# Patient Record
Sex: MALE | Race: WHITE | Employment: FULL TIME | URBAN - METROPOLITAN AREA
[De-identification: names, ages, dates, MRNs, and addresses within clinical notes are randomized per-mention and may not be internally consistent; named-entity substitution may affect disease eponyms.]

---
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
## 2022-06-13 ENCOUNTER — HOSPITAL ENCOUNTER (EMERGENCY)
Age: 22
Discharge: HOME | End: 2022-06-13

## 2022-06-13 VITALS
BODY MASS INDEX: 21.31 KG/M2 | DIASTOLIC BLOOD PRESSURE: 67 MMHG | SYSTOLIC BLOOD PRESSURE: 114 MMHG | OXYGEN SATURATION: 99 % | TEMPERATURE: 97 F | WEIGHT: 175 LBS | HEIGHT: 76 IN | RESPIRATION RATE: 16 BRPM | HEART RATE: 70 BPM

## 2022-06-13 DIAGNOSIS — Z53.21 PATIENT LEFT BEFORE EVALUATION BY PHYSICIAN: Primary | ICD-10-CM

## 2022-06-13 ASSESSMENT — PAIN SCALES - GENERAL: PAINLEVEL_OUTOF10: 2

## 2022-06-13 ASSESSMENT — PAIN DESCRIPTION - LOCATION: LOCATION: ARM

## 2022-06-13 ASSESSMENT — PAIN - FUNCTIONAL ASSESSMENT: PAIN_FUNCTIONAL_ASSESSMENT: 0-10

## 2022-06-13 ASSESSMENT — PAIN DESCRIPTION - ORIENTATION: ORIENTATION: LEFT

## 2022-06-13 NOTE — ED PROVIDER NOTES
Patient left department prior to being evaluated by provider     DORA Fischer CNP  06/13/22 2000 [facility], APRN - CNP  06/13/22 1955

## 2022-07-28 ENCOUNTER — HOSPITAL ENCOUNTER (EMERGENCY)
Age: 22
Discharge: HOME | End: 2022-07-28
Attending: STUDENT IN AN ORGANIZED HEALTH CARE EDUCATION/TRAINING PROGRAM

## 2022-07-28 VITALS
HEART RATE: 98 BPM | OXYGEN SATURATION: 99 % | SYSTOLIC BLOOD PRESSURE: 117 MMHG | RESPIRATION RATE: 16 BRPM | DIASTOLIC BLOOD PRESSURE: 71 MMHG | TEMPERATURE: 98.1 F

## 2022-07-28 DIAGNOSIS — K04.7 DENTAL INFECTION: Primary | ICD-10-CM

## 2022-07-28 DIAGNOSIS — J02.9 SORE THROAT: ICD-10-CM

## 2022-07-28 LAB
RAPID INFLUENZA  B AGN: NEGATIVE
RAPID INFLUENZA A AGN: NEGATIVE
S PYO AG THROAT QL: NEGATIVE
SARS-COV-2, NAAT: NOT DETECTED

## 2022-07-28 PROCEDURE — 87081 CULTURE SCREEN ONLY: CPT

## 2022-07-28 PROCEDURE — 99283 EMERGENCY DEPT VISIT LOW MDM: CPT

## 2022-07-28 PROCEDURE — 6370000000 HC RX 637 (ALT 250 FOR IP): Performed by: REGISTERED NURSE

## 2022-07-28 PROCEDURE — 87635 SARS-COV-2 COVID-19 AMP PRB: CPT

## 2022-07-28 PROCEDURE — 87880 STREP A ASSAY W/OPTIC: CPT

## 2022-07-28 PROCEDURE — 87804 INFLUENZA ASSAY W/OPTIC: CPT

## 2022-07-28 PROCEDURE — 87077 CULTURE AEROBIC IDENTIFY: CPT

## 2022-07-28 RX ORDER — IBUPROFEN 600 MG/1
600 TABLET ORAL ONCE
Status: COMPLETED | OUTPATIENT
Start: 2022-07-28 | End: 2022-07-28

## 2022-07-28 RX ORDER — PENICILLIN V POTASSIUM 500 MG/1
500 TABLET ORAL 4 TIMES DAILY
Qty: 28 TABLET | Refills: 0 | Status: SHIPPED | OUTPATIENT
Start: 2022-07-28 | End: 2022-08-04

## 2022-07-28 RX ORDER — CHLORHEXIDINE GLUCONATE 0.12 MG/ML
15 RINSE ORAL 2 TIMES DAILY
Qty: 1 EACH | Refills: 0 | Status: SHIPPED | OUTPATIENT
Start: 2022-07-28 | End: 2022-08-11

## 2022-07-28 RX ORDER — NAPROXEN 500 MG/1
500 TABLET ORAL 2 TIMES DAILY PRN
Qty: 20 TABLET | Refills: 0 | Status: CANCELLED | OUTPATIENT
Start: 2022-07-28 | End: 2022-08-07

## 2022-07-28 RX ORDER — PENICILLIN V POTASSIUM 500 MG/1
500 TABLET ORAL 4 TIMES DAILY
Qty: 28 TABLET | Refills: 0 | Status: CANCELLED | OUTPATIENT
Start: 2022-07-28 | End: 2022-08-04

## 2022-07-28 RX ORDER — NAPROXEN 500 MG/1
500 TABLET ORAL 2 TIMES DAILY PRN
Qty: 20 TABLET | Refills: 0 | Status: SHIPPED | OUTPATIENT
Start: 2022-07-28 | End: 2022-08-07

## 2022-07-28 RX ORDER — CHLORHEXIDINE GLUCONATE 0.12 MG/ML
15 RINSE ORAL 2 TIMES DAILY
Qty: 1 EACH | Refills: 0 | Status: CANCELLED | OUTPATIENT
Start: 2022-07-28 | End: 2022-08-11

## 2022-07-28 RX ADMIN — IBUPROFEN 600 MG: 600 TABLET ORAL at 11:37

## 2022-07-28 ASSESSMENT — ENCOUNTER SYMPTOMS
RHINORRHEA: 0
ABDOMINAL PAIN: 0
FACIAL SWELLING: 0
SORE THROAT: 1
SINUS PAIN: 0
SHORTNESS OF BREATH: 0
BACK PAIN: 0
CONSTIPATION: 0
DIARRHEA: 0
COUGH: 0
SINUS PRESSURE: 0
CHEST TIGHTNESS: 0
NAUSEA: 0
VOMITING: 0
EYE PAIN: 0
WHEEZING: 0
TROUBLE SWALLOWING: 0

## 2022-07-28 ASSESSMENT — PAIN DESCRIPTION - LOCATION: LOCATION: THROAT

## 2022-07-28 ASSESSMENT — PAIN - FUNCTIONAL ASSESSMENT: PAIN_FUNCTIONAL_ASSESSMENT: 0-10

## 2022-07-28 ASSESSMENT — PAIN SCALES - GENERAL: PAINLEVEL_OUTOF10: 7

## 2022-07-28 ASSESSMENT — PAIN DESCRIPTION - ORIENTATION: ORIENTATION: RIGHT

## 2022-07-28 NOTE — Clinical Note
Mother accompanied Rajendra Gomez to the emergency department on 7/28/2022. They may return to work on 07/29/2022. If you have any questions or concerns, please don't hesitate to call.       Montez Johnson, DORA - CNP

## 2022-07-28 NOTE — ED PROVIDER NOTES
[facility]  ED  EMERGENCY DEPARTMENT ENCOUNTER        Pt Name: Val Field  MRN: [id number]  [place] 2000  Date of evaluation: 7/28/2022  Provider: DORA Bui CNP  PCP: No primary care provider on file. This patient was seen and evaluated by the attending physician  Todd Villaseñor MD.    I have evaluated this patient. My supervising physician was available for consultation. CHIEF COMPLAINT       Chief Complaint   Patient presents with    Pharyngitis     Began a few days pta, pain worse with swallowing swollen lmyph node on right side. HISTORY OF PRESENT ILLNESS   (Location/Symptom, Timing/Onset, Context/Setting, Quality, Duration, Modifying Factors, Severity)  Note limiting factors. Val Field is a 25 y.o. male who presents via private car for complaints of sore throat and dental pain. Onset was 2 days ago. Duration has been since the onset. Context includes patient reports approximately 2 days ago he began with generalized sore throat that was exacerbated by swallowing. He denies any sublingual swelling, mastoid tenderness, neck pain with movement, difficulty tolerating oral secretions or swallowing. He states that he started with dental pain along with his sore throat yesterday. He endorses the pain is right-sided in his lower jaw. He denies any fevers, changes to his vision or headaches. He denies any chest pain, shortness of breath or abdominal pain. Quality is dull and aching with radiation to his throat and teeth. Alleviating factors include nothing. Aggravating factors include swallowing and palpation of the tooth. Pain is 7/10. Nothing has been used for pain today. Chart review reveals pt has no significant past medical history. .     Nursing Notes were all reviewed and agreed with or any disagreements were addressed  in the HPI. Pt was seen during the [place] 19 pandemic. Appropriate PPE worn by ME during patient encounters. Pt seen during a time with constrained hospital bed capacity and other potential inpatient and outpatient resources were constrained due to the viral pandemic. REVIEW OF SYSTEMS    (2-9 systems for level 4, 10 or more for level 5)     Review of Systems   Constitutional:  Negative for chills, diaphoresis and fever. HENT:  Positive for dental problem and sore throat. Negative for congestion, drooling, facial swelling, rhinorrhea, sinus pressure, sinus pain, tinnitus and trouble swallowing. Right-sided dental pain   Eyes:  Negative for pain and visual disturbance. Respiratory:  Negative for cough, chest tightness, shortness of breath and wheezing. Cardiovascular:  Negative for chest pain, palpitations and leg swelling. Gastrointestinal:  Negative for abdominal pain, constipation, diarrhea, nausea and vomiting. Genitourinary:  Negative for dysuria, frequency and urgency. Musculoskeletal:  Negative for back pain and neck pain. Skin:  Negative for rash and wound. Neurological:  Negative for dizziness and light-headedness. Positives and Pertinent negatives as per HPI. Except as noted abovein the ROS, all other systems were reviewed and negative. PAST MEDICAL HISTORY     Past Medical History:   Diagnosis Date    Influenza A 03/09/2019    Knee injury 2009    RIGHT         SURGICAL HISTORY     Past Surgical History:   Procedure Laterality Date    KNEE ARTHROSCOPY Right 12/3/15    Video arthroscopy with open arthrotomy and  Repair of osteochondral fragement and cartilage allograft         CURRENTMEDICATIONS       Previous Medications    No medications on file         ALLERGIES     Patient has no known allergies.     FAMILYHISTORY       Family History   Problem Relation Age of Onset    Other Mother         DDD          SOCIAL HISTORY       Social History     Socioeconomic History    Marital status: Single     Spouse name: None    Number of children: None    Years of education: None Highest education level: None   Tobacco Use    Smoking status: Every Day     Packs/day: 1.00     Types: Cigarettes    Smokeless tobacco: Never   Substance and Sexual Activity    Alcohol use: No    Drug use: No    Sexual activity: Never       SCREENINGS    Terry Coma Scale  Eye Opening: Spontaneous  Best Verbal Response: Oriented  Best Motor Response: Obeys commands  [place] Coma Scale Score: 15        PHYSICAL EXAM    (up to 7 for level 4, 8 or more for level 5)     ED Triage Vitals   BP Temp Temp src Pulse Resp SpO2 Height Weight   -- -- -- -- -- -- -- --       Physical Exam  Vitals and nursing note reviewed. Constitutional:       Appearance: Normal appearance. He is not ill-appearing or diaphoretic. HENT:      Head: Normocephalic and atraumatic. Jaw: There is normal jaw occlusion. No trismus, tenderness, swelling, pain on movement or malocclusion. Right Ear: External ear normal. No mastoid tenderness. Left Ear: External ear normal. No mastoid tenderness. Nose: Nose normal.      Mouth/Throat:      Lips: Pink. Mouth: Mucous membranes are moist. No injury, lacerations, oral lesions or angioedema. Dentition: Abnormal dentition. Does not have dentures. Dental tenderness, gingival swelling and dental caries present. No dental abscesses or gum lesions. Tongue: No lesions. Tongue does not deviate from midline. Palate: No mass and lesions. Pharynx: Uvula midline. Posterior oropharyngeal erythema present. No pharyngeal swelling, oropharyngeal exudate or uvula swelling. Tonsils: No tonsillar exudate or tonsillar abscesses. Comments: Dental tenderness to tooth 32 with some surrounding erythema and swelling at the gumline. No obvious palpable abscess or area of fluctuance. Uvula is midline. Patient denies mastoid tenderness, sublingual swelling, neck pain, difficulty swallowing or tolerating oral secretions does not exhibit trismus.   Mild sparse palatal petechiae noted in posterior pharynx. No exudate  Eyes:      General:         Right eye: No discharge. Left eye: No discharge. Extraocular Movements: Extraocular movements intact. Pupils: Pupils are equal, round, and reactive to light. Cardiovascular:      Rate and Rhythm: Normal rate and regular rhythm. Pulses: Normal pulses. Heart sounds: Normal heart sounds. No murmur heard. No friction rub. No gallop. Pulmonary:      Effort: Pulmonary effort is normal. No respiratory distress. Breath sounds: Normal breath sounds. No stridor. No wheezing, rhonchi or rales. Abdominal:      General: Abdomen is flat. Bowel sounds are normal.      Palpations: Abdomen is soft. Musculoskeletal:         General: Normal range of motion. Cervical back: Normal range of motion and neck supple. Skin:     General: Skin is warm and dry. Capillary Refill: Capillary refill takes less than 2 seconds. Neurological:      General: No focal deficit present. Mental Status: He is alert and oriented to person, place, and time. Psychiatric:         Mood and Affect: Mood normal.         Behavior: Behavior normal.     PHYSICAL EXAM  /71   Pulse 98   Temp 98.1 °F (36.7 °C) (Oral)   Resp 16   SpO2 99%     DIAGNOSTIC RESULTS   LABS:    Labs Reviewed   COVID-19, RAPID   STREP SCREEN GROUP A THROAT   RAPID INFLUENZA A/B ANTIGENS   CULTURE, BETA STREP CONFIRM PLATES       All other labs were within normal range or not returned as of this dictation. EKG: All EKG's are interpreted by the Emergency Department Physician who either signs orCo-signs this chart in the absence of a cardiologist.  Please see their note for interpretation of EKG.       RADIOLOGY:   Non-plain film images such as CT, Ultrasound and MRI are read by the radiologist. Plain radiographic images are visualized andpreliminarily interpreted by the  ED Provider with the below findings:        Interpretation perthe Radiologist below, if available at the time of this note:    No orders to display     No results found. PROCEDURES   Unless otherwise noted below, none     Procedures    CRITICAL CARE TIME   N/A    CONSULTS:  None      EMERGENCY DEPARTMENT COURSE and DIFFERENTIALDIAGNOSIS/MDM:   Vitals:    Vitals:    07/28/22 1108   BP: 117/71   Pulse: 98   Resp: 16   Temp: 98.1 °F (36.7 °C)   TempSrc: Oral   SpO2: 99%       Patient was given thefollowing medications:  Medications   ibuprofen (ADVIL;MOTRIN) tablet 600 mg (600 mg Oral Given 7/28/22 1137)       PDMP Monitoring:    Last PDMP Valentin as Reviewed [facility]):  Review User Review Instant Review Result            Urine Drug Screenings (1 yr)    No resulted procedures found. Medication Contract and Consent for Opioid Use Documents Filed        No documents found                    MDM:   This patient was seen and evaluated by myself and my attending physician. He presents to the emergency department today with complaints of sore throat and right-sided dental pain. On exam he is alert and oriented, hemodynamically stable and nontoxic in appearance. He will have a work-up in the emergency department consisting of laboratory studies and be medicated for pain. Laboratory studies evaluated and reviewed with the patient. Testing for COVID, influenza and strep pharyngitis were negative in the emergency department. A strep culture is pending. I believe his sore throat to be viral in nature. On patient's dental exam he does exhibit several teeth with dental caries that have broken. Back right molar tooth #32 does exhibit some generalized erythema and tenderness to palpation. He will be given antibiotics and instructed to follow-up with a dentist for further evaluation.   Strict follow-up precautions were provided for the emergency department including but not limited to increases in pain, fevers, difficulty swallowing or tolerating oral secretions, neck pain, sublingual swelling or difficulty opening and closing jaw. He was instructed to take his entire course of antibiotics and follow-up with a dentist.    Is this patient to be included in the SEP-1 Core Measure due to severe sepsis or septic shock? No   Exclusion criteria - the patient is NOT to be included for SEP-1 Core Measure due to:  2+ SIRS criteria are not met    Discharge Time out:  CC Reviewed Yes   Test Results Yes     Vitals:    07/28/22 1108   BP: 117/71   Pulse: 98   Resp: 16   Temp: 98.1 °F (36.7 °C)   SpO2: 99%              FINAL IMPRESSION      1. Dental infection    2. Sore throat          DISPOSITION/PLAN   DISPOSITION Decision To Discharge 07/28/2022 12:45:11 PM      PATIENT REFERREDTO:  [facility]  ED  [facility] [address]  [phone]    As needed, If symptoms worsen    [facility]) Pre-Services  [phone]        DISCHARGE MEDICATIONS:  New Prescriptions    CHLORHEXIDINE (PERIDEX) 0.12 % SOLUTION    Take 15 mLs by mouth in the morning and 15 mLs before bedtime. Do all this for 14 days. NAPROXEN (NAPROSYN) 500 MG TABLET    Take 1 tablet by mouth 2 times daily as needed for Pain    PENICILLIN V POTASSIUM (VEETID) 500 MG TABLET    Take 1 tablet by mouth in the morning and 1 tablet at noon and 1 tablet in the evening and 1 tablet before bedtime. Do all this for 7 days.        DISCONTINUED MEDICATIONS:  Discontinued Medications    No medications on file              (Please note that portions ofthis note were completed with a voice recognition program.  Efforts were made to edit the dictations but occasionally words are mis-transcribed.)    DORA Mcclendon CNP (electronically signed)       DORA Mcclendon CNP  07/28/22 4389

## 2022-07-28 NOTE — DISCHARGE INSTRUCTIONS
Please follow-up with a dentist to evaluate your teeth and repair cracked teeth and cavities. You may alternate Tylenol and naproxen as needed for pain as well as utilize the Peridex mouthwash to swish and spit. Please complete your entire course of antibiotics. Your testing for COVID, flu and strep throat were negative in the emergency department today. You also were provided with a referral for a primary care physician, please call to establish care. Please return to the emergency department for new or worsening symptoms such as increasing swelling, difficulty swallowing or tolerating oral secretions, swelling under your tongue, pain to your neck or head, or difficulty opening or closing her jaw. Dental Emergency Referrals    18 kristina [facility] residents only)    [facility]  [address] [phone]   [facility].  [phone]   [address] [place] [address] Road  [phone]   [facility]  (entrance on [address] [place] [address]. [address] [place] [address].)  [address] [facility]  [phone]   [facility] [address].  [phone]   [facility] [address] [facility].  [phone]       [address] [facility]  [address] [facility].  [phone]   [facility])  [address]  [phone]     [facility]  [address] 2nd floor  [phone]   [facility]  [phone] ([address] [address])  Susan: [address]: [address]   [phone] 200 [facility] 128  [facility]  [phone] of [facility]  [address] [facility] [address]  [phone]   [address]  111 [facility] Hern[facility]dez.  Oral Surgery Dept: [phone]  Dental Clinic: [address]  [facility]  [phone]     [address] [facility] [address] [phone]   Urgent Dental Care   [address] [facility], [address]  [facility] : [address] : [phone]     [address] [facility] [address]    Other [address] in the area    [address] (Dental Urgent Care)  Crossings of [facility]  (Across from [address])  [facility], [address] [facility],[address]  [phone]?       Pediatric Only Dentists    [address]Third Floor   Up to age 21  [address] [facility]  [phone]    [address] Floor  Up to age 24  Peggy: [place] on [address]   [phone] or [address] [facility] Roland: [address] [place] [address]  Up to age 12  J[address] 1959 [phone]

## 2022-07-30 LAB
ORGANISM: ABNORMAL
S PYO THROAT QL CULT: ABNORMAL
S PYO THROAT QL CULT: ABNORMAL

## 2023-04-15 ENCOUNTER — HOSPITAL ENCOUNTER (EMERGENCY)
Age: 23
Discharge: HOME | End: 2023-04-15
Payer: OTHER MISCELLANEOUS

## 2023-04-15 ENCOUNTER — APPOINTMENT (OUTPATIENT)
Dept: GENERAL RADIOLOGY | Age: 23
End: 2023-04-15
Payer: OTHER MISCELLANEOUS

## 2023-04-15 VITALS
RESPIRATION RATE: 16 BRPM | WEIGHT: 180 LBS | OXYGEN SATURATION: 100 % | BODY MASS INDEX: 21.92 KG/M2 | SYSTOLIC BLOOD PRESSURE: 128 MMHG | HEART RATE: 84 BPM | HEIGHT: 76 IN | TEMPERATURE: 98 F | DIASTOLIC BLOOD PRESSURE: 66 MMHG

## 2023-04-15 DIAGNOSIS — S80.01XA CONTUSION OF RIGHT KNEE, INITIAL ENCOUNTER: ICD-10-CM

## 2023-04-15 DIAGNOSIS — S63.502A SPRAIN OF LEFT WRIST, INITIAL ENCOUNTER: ICD-10-CM

## 2023-04-15 DIAGNOSIS — S13.4XXA WHIPLASH INJURY TO NECK, INITIAL ENCOUNTER: ICD-10-CM
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
## 2023-04-15 PROCEDURE — 6370000000 HC RX 637 (ALT 250 FOR IP): Performed by: PHYSICIAN ASSISTANT

## 2023-04-15 PROCEDURE — 99283 EMERGENCY DEPT VISIT LOW MDM: CPT

## 2023-04-15 PROCEDURE — 73562 X-RAY EXAM OF KNEE 3: CPT

## 2023-04-15 RX ORDER — METHOCARBAMOL 750 MG/1
750 TABLET, FILM COATED ORAL 4 TIMES DAILY
Qty: 40 TABLET | Refills: 0 | Status: SHIPPED | OUTPATIENT
Start: 2023-04-15 | End: 2023-04-25

## 2023-04-15 RX ORDER — METHOCARBAMOL 750 MG/1
750 TABLET, FILM COATED ORAL ONCE
Status: COMPLETED | OUTPATIENT
Start: 2023-04-15 | End: 2023-04-15

## 2023-04-15 RX ORDER — NAPROXEN 500 MG/1
500 TABLET ORAL 2 TIMES DAILY
Qty: 30 TABLET | Refills: 0 | Status: SHIPPED | OUTPATIENT
Start: 2023-04-15

## 2023-04-15 RX ORDER — KETOROLAC TROMETHAMINE 10 MG/1
10 TABLET, FILM COATED ORAL ONCE
Status: COMPLETED | OUTPATIENT
Start: 2023-04-15 | End: 2023-04-15

## 2023-04-15 RX ADMIN — KETOROLAC TROMETHAMINE 10 MG: 10 TABLET, FILM COATED ORAL at 17:18

## 2023-04-15 RX ADMIN — METHOCARBAMOL 750 MG: 750 TABLET ORAL at 17:18

## 2023-04-15 ASSESSMENT — PAIN - FUNCTIONAL ASSESSMENT: PAIN_FUNCTIONAL_ASSESSMENT: 0-10

## 2023-04-15 ASSESSMENT — PAIN SCALES - GENERAL
PAINLEVEL_OUTOF10: 4
PAINLEVEL_OUTOF10: 4

## 2023-04-15 ASSESSMENT — PAIN DESCRIPTION - FREQUENCY: FREQUENCY: CONTINUOUS

## 2023-04-15 ASSESSMENT — PAIN DESCRIPTION - PAIN TYPE: TYPE: ACUTE PAIN
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
## 2023-04-15 NOTE — ED NOTES
Velcro wrist splint applied to patient left wrist.   PA aware.       Melisa Alpers, MEETA  04/15/23 7178